# Patient Record
Sex: MALE | Race: OTHER | Employment: STUDENT | URBAN - METROPOLITAN AREA
[De-identification: names, ages, dates, MRNs, and addresses within clinical notes are randomized per-mention and may not be internally consistent; named-entity substitution may affect disease eponyms.]

---
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
## 2024-08-05 NOTE — TELEPHONE ENCOUNTER
LOV    LAST LAB    LAST RX     Next OV No future appointments.     PROTOCOL NONE     Rx given  DENIED AS DUPLICATE, INSTRUCTIONS TO PHARMACY TO CHECK FOR NEW/ REFILLS
Activity Order: Out of bed with Assistance 8/5/24; Patient ok for PT Evaluation without restriction, as per RN Desiree/yes

## (undated) DIAGNOSIS — F90.0 ATTENTION DEFICIT HYPERACTIVITY DISORDER (ADHD), PREDOMINANTLY INATTENTIVE TYPE: ICD-10-CM

## (undated) NOTE — LETTER
Date: 12/23/2022    Patient Name: Elyse Gr          To Whom it may concern: The above patient was seen at the [facility] for treatment of a medical condition. Patient can work without restrictions. Sincerely,    Thania Meza MD  Hand, Wrist, & Elbow Surgery  [facility]  [facility] [address], [address] [facility], Janet Chi[address] [place] [address] org  Nate@"[employer]. org  t: [id number]  f: [phone]